# Patient Record
Sex: MALE | Race: WHITE | NOT HISPANIC OR LATINO | Employment: FULL TIME | URBAN - METROPOLITAN AREA
[De-identification: names, ages, dates, MRNs, and addresses within clinical notes are randomized per-mention and may not be internally consistent; named-entity substitution may affect disease eponyms.]

---
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
## 2021-01-06 ENCOUNTER — TELEPHONE (OUTPATIENT)
Dept: CARDIOLOGY | Facility: MEDICAL CENTER | Age: 21
End: 2021-01-06

## 2021-01-06 NOTE — TELEPHONE ENCOUNTER
LVM asking if patient has seen another Cardiologist in the past or had any cardiac testing done outside of [facility] to call with information so that records can be requested prior to appointment.

## 2021-01-11 ENCOUNTER — OFFICE VISIT (OUTPATIENT)
Dept: CARDIOLOGY | Facility: MEDICAL CENTER | Age: 21
End: 2021-01-11
Payer: COMMERCIAL

## 2021-01-11 VITALS
HEART RATE: 77 BPM | DIASTOLIC BLOOD PRESSURE: 58 MMHG | SYSTOLIC BLOOD PRESSURE: 112 MMHG | OXYGEN SATURATION: 98 % | HEIGHT: 76 IN | BODY MASS INDEX: 20.46 KG/M2 | WEIGHT: 168 LBS

## 2021-01-11 DIAGNOSIS — R06.02 SOB (SHORTNESS OF BREATH): ICD-10-CM

## 2021-01-11 DIAGNOSIS — R00.2 PALPITATIONS: ICD-10-CM

## 2021-01-11 PROCEDURE — 99204 OFFICE O/P NEW MOD 45 MIN: CPT | Performed by: INTERNAL MEDICINE

## 2021-01-11 RX ORDER — PSEUDOEPHED/ACETAMINOPH/DIPHEN 30MG-500MG
TABLET ORAL
COMMUNITY
Start: 2020-11-18 | End: 2022-11-07

## 2021-01-11 RX ORDER — NAPROXEN 250 MG/1
TABLET ORAL
COMMUNITY
Start: 2020-11-18 | End: 2022-01-03

## 2021-01-11 RX ORDER — CLINDAMYCIN HYDROCHLORIDE 300 MG/1
CAPSULE ORAL
COMMUNITY
Start: 2020-11-18 | End: 2022-11-07

## 2021-01-11 ASSESSMENT — ENCOUNTER SYMPTOMS
BLURRED VISION: 1
INSOMNIA: 1
GASTROINTESTINAL NEGATIVE: 1
MUSCULOSKELETAL NEGATIVE: 1
PALPITATIONS: 1
SHORTNESS OF BREATH: 1
NEUROLOGICAL NEGATIVE: 1

## 2021-01-11 ASSESSMENT — FIBROSIS 4 INDEX: FIB4 SCORE: 0.42

## 2021-01-12 NOTE — PROGRESS NOTES
"Chief Complaint   Patient presents with   • New Patient   • Palpitations       Subjective:   Jose Enrique Cannon is a 20 y.o. male who presents today for evaluation of palpitation and concerning of the finding on his echocardiography    The patient and in consultation request of Ebony ALVAREZ for above issues.    He is a20 years old male with no known major medical problem.  He is 6 foot 4 and weigh 168 pounds.  His father and his brother both about 6' 2\".  There is no family history of aortic dissection or any significant valvular issue.    He has been experiencing intermittent palpitation for approximately 2 years or so.    First episode occurred after he got stung by a bee.  Has been experiencing shortness of breath with exertion and after multiple testings was told that he may have exercise-induced asthma.  Bronchodilator also appears to be helping.    He described his palpitation as strong thumping and fast beat. They have been somewhat infrequent.  He would experience some discomfort in the low anterior chest with them but he has not experienced syncope so far.     He underwent echocardiography on December 21 mentioned \"elongation of the anterior mitral leaflet\" but the conclusion stated that it was the study was normal.    Because of that that statement along with his symptoms his body habitus, the primary care provider has some concern referred to our clinic for evaluation.    Past Medical History:   Diagnosis Date   • Allergy    • Anxiety      History reviewed. No pertinent surgical history.  Family History   Problem Relation Age of Onset   • Diabetes Mother         prediabetic   • No Known Problems Father      Social History     Socioeconomic History   • Marital status: Single     Spouse name: Not on file   • Number of children: Not on file   • Years of education: Not on file   • Highest education level: Not on file   Occupational History   • Not on file   Social Needs   • Financial resource strain: " Not on file   • Food insecurity     Worry: Not on file     Inability: Not on file   • Transportation needs     Medical: Not on file     Non-medical: Not on file   Tobacco Use   • Smoking status: Never Smoker   • Smokeless tobacco: Never Used   Substance and Sexual Activity   • Alcohol use: Yes     Frequency: Monthly or less     Drinks per session: 3 or 4     Binge frequency: Never   • Drug use: Yes     Types: Marijuana, Inhaled   • Sexual activity: Not Currently   Lifestyle   • Physical activity     Days per week: Not on file     Minutes per session: Not on file   • Stress: Not on file   Relationships   • Social connections     Talks on phone: Not on file     Gets together: Not on file     Attends [employer] service: Not on file     Active member of club or organization: Not on file     Attends meetings of clubs or organizations: Not on file     Relationship status: Not on file   • Intimate partner violence     Fear of current or ex partner: Not on file     Emotionally abused: Not on file     Physically abused: Not on file     Forced sexual activity: Not on file   Other Topics Concern   • Behavioral problems Not Asked   • Interpersonal relationships Not Asked   • Sad or not enjoying activities Not Asked   • Suicidal thoughts Not Asked   • Poor school performance Not Asked   • Reading difficulties Not Asked   • Speech difficulties Not Asked   • Writing difficulties Not Asked   • Inadequate sleep Not Asked   • Excessive TV viewing Not Asked   • Excessive video game use Not Asked   • Inadequate exercise Not Asked   • Sports related Not Asked   • Poor diet Not Asked   • Family concerns for drug/alcohol abuse Not Asked   • Poor oral hygiene Not Asked   • Bike safety Not Asked   • Family concerns vehicle safety Not Asked   Social History Narrative   • Not on file     Allergies   Allergen Reactions   • Penicillins Rash     Outpatient Encounter Medications as of 1/11/2021   Medication Sig Dispense Refill   • ACETAMINOPHEN  "EXTRA STRENGTH 500 MG Tab TAKE 2 TABLETS BY MOUTH EVERY 6 HOURS FOR 3 DAYS AS NEEDED FOR FEVER     • clindamycin (CLEOCIN) 300 MG Cap TAKE 1 CAPSULE BY MOUTH EVERY 6 HOURS FOR 10 DAYS     • naproxen (NAPROSYN) 250 MG Tab TAKE 2 TABLETS BY MOUTH TWICE A DAY FOR 3 DAYS     • montelukast (SINGULAIR) 10 MG Tab Take 1 Tab by mouth every day. (Patient not taking: Reported on 11/16/2020) 90 Tab 0     No facility-administered encounter medications on file as of 1/11/2021.      Review of Systems   HENT: Negative.    Eyes: Positive for blurred vision.   Respiratory: Positive for shortness of breath.    Cardiovascular: Positive for chest pain and palpitations.   Gastrointestinal: Negative.    Genitourinary: Negative.    Musculoskeletal: Negative.    Skin: Negative.    Neurological: Negative.    Endo/Heme/Allergies: Negative.    Psychiatric/Behavioral: The patient has insomnia.    All other systems reviewed and are negative.       Objective:   /58 (BP Location: Left arm, Patient Position: Sitting, BP Cuff Size: Adult)   Pulse 77   Ht 1.93 m (6' 4\")   Wt 76.2 kg (168 lb)   SpO2 98%   BMI 20.45 kg/m²     Physical Exam   Constitutional: He is oriented to person, place, and time. He appears well-developed and well-nourished.   Neck: No JVD present.   Cardiovascular: Normal rate and regular rhythm. Exam reveals no gallop.   No murmur heard.  Flat chest  Straight back   Pulmonary/Chest: Effort normal and breath sounds normal. No respiratory distress. He has no wheezes. He has no rales.   Abdominal: Soft. He exhibits no distension. There is no abdominal tenderness.   Musculoskeletal:         General: No edema.   Neurological: He is alert and oriented to person, place, and time.   Skin: Skin is warm and dry. No erythema.   Psychiatric: He has a normal mood and affect. His behavior is normal.     EKG from January 6 by my review shows sinus rhythm, early transition but oterwise normal    Did review images of his " echocardiography from December 21.  I believe that his mitral valve thickness is normal.  No significant redundancy of the subvalvular structure or mitral leaflet.  The rest of the studies is also normal.    Assessment:     1. Palpitations     2. SOB (shortness of breath)  CANCELED: EKG       Medical Decision Making:  Today's Assessment / Status / Plan:     I had a long discussion with him and his mother.  I believe that his symptoms and the finding of echocardiography predominantly due to his body habitus (sytragight back along with flat chest).  At this point his palpitations are infrequent.  The description is more of either sinus tachycardia or ectopic beat.  We discussed about cardiac monitor if it become more frequent in the future.  I advised him to continue follow-up with his primary care provider for now and to contact us if it become more frequent.  In terms of follow-up on his mitral valve, he consider having repeat echocardiography in 3 to 5 years or sooner if his symptoms worsen prior to that time.

## 2021-11-16 ENCOUNTER — OFFICE VISIT (OUTPATIENT)
Dept: URGENT CARE | Facility: CLINIC | Age: 21
End: 2021-11-16
Payer: COMMERCIAL

## 2021-11-16 VITALS
RESPIRATION RATE: 16 BRPM | DIASTOLIC BLOOD PRESSURE: 68 MMHG | HEART RATE: 96 BPM | BODY MASS INDEX: 21.61 KG/M2 | OXYGEN SATURATION: 90 % | SYSTOLIC BLOOD PRESSURE: 122 MMHG | TEMPERATURE: 98.7 F | WEIGHT: 183 LBS | HEIGHT: 77 IN

## 2021-11-16 DIAGNOSIS — J02.8 ACUTE PHARYNGITIS DUE TO OTHER SPECIFIED ORGANISMS: ICD-10-CM

## 2021-11-16 DIAGNOSIS — J02.9 SORE THROAT: ICD-10-CM

## 2021-11-16 LAB
INT CON NEG: NORMAL
INT CON POS: NORMAL
S PYO AG THROAT QL: NEGATIVE

## 2021-11-16 PROCEDURE — 99213 OFFICE O/P EST LOW 20 MIN: CPT | Performed by: NURSE PRACTITIONER

## 2021-11-16 PROCEDURE — 87880 STREP A ASSAY W/OPTIC: CPT | Performed by: NURSE PRACTITIONER

## 2021-11-16 ASSESSMENT — ENCOUNTER SYMPTOMS
ORTHOPNEA: 0
DIARRHEA: 0
COUGH: 0
SORE THROAT: 1
CHILLS: 1
NAUSEA: 0
EYE DISCHARGE: 0
HEADACHES: 1
MYALGIAS: 0
FEVER: 1

## 2021-11-16 ASSESSMENT — FIBROSIS 4 INDEX: FIB4 SCORE: 0.42

## 2021-11-16 NOTE — LETTER
November 16, 2021        Jose Enrique Cannon  [address] [place] [address]        Jose Enrique was seen in our clinic today and he is excused from work for yesterday and today. Thank you.  If you have any questions or concerns, please don't hesitate to call.        Sincerely,        MICHELLE Chu.P.DAVID.    Electronically Signed

## 2021-11-16 NOTE — PROGRESS NOTES
Subjective     Jose Enrique Cannon is a 20 y.o. male who presents with Sore Throat (sore throat x 2 days)            HPI   New problem.  Patient is a 20-year-old male who presents with a 2-day history of sore throat.  He states that his sore throat started yesterday and is accompanied by bad headache and a low-grade fever of 100.4.  He denies congestion, cough, nausea, diarrhea, body aches, fever, or chills.  He does state he feels improved today.  He has been taking over-the-counter Mucinex for his symptoms.  He is fully vaccinated for COVID-19.    Penicillins  Current Outpatient Medications on File Prior to Visit   Medication Sig Dispense Refill   • ACETAMINOPHEN EXTRA STRENGTH 500 MG Tab TAKE 2 TABLETS BY MOUTH EVERY 6 HOURS FOR 3 DAYS AS NEEDED FOR FEVER     • clindamycin (CLEOCIN) 300 MG Cap TAKE 1 CAPSULE BY MOUTH EVERY 6 HOURS FOR 10 DAYS     • naproxen (NAPROSYN) 250 MG Tab TAKE 2 TABLETS BY MOUTH TWICE A DAY FOR 3 DAYS       No current facility-administered medications on file prior to visit.     Social History     Socioeconomic History   • Marital status: Single     Spouse name: Not on file   • Number of children: Not on file   • Years of education: Not on file   • Highest education level: Not on file   Occupational History   • Not on file   Tobacco Use   • Smoking status: Never Smoker   • Smokeless tobacco: Never Used   Vaping Use   • Vaping Use: Some days   • Substances: THC   • Devices: Disposable   Substance and Sexual Activity   • Alcohol use: Yes   • Drug use: Yes     Types: Marijuana, Inhaled   • Sexual activity: Not Currently   Other Topics Concern   • Behavioral problems Not Asked   • Interpersonal relationships Not Asked   • Sad or not enjoying activities Not Asked   • Suicidal thoughts Not Asked   • Poor school performance Not Asked   • Reading difficulties Not Asked   • Speech difficulties Not Asked   • Writing difficulties Not Asked   • Inadequate sleep Not Asked   • Excessive TV viewing Not  Asked   • Excessive video game use Not Asked   • Inadequate exercise Not Asked   • Sports related Not Asked   • Poor diet Not Asked   • Family concerns for drug/alcohol abuse Not Asked   • Poor oral hygiene Not Asked   • Bike safety Not Asked   • Family concerns vehicle safety Not Asked   Social History Narrative   • Not on file     Social Determinants of Health     Financial Resource Strain:    • Difficulty of Paying Living Expenses: Not on file   Food Insecurity:    • Worried About Running Out of Food in the Last Year: Not on file   • Ran Out of Food in the Last Year: Not on file   Transportation Needs:    • Lack of Transportation (Medical): Not on file   • Lack of Transportation (Non-Medical): Not on file   Physical Activity:    • Days of Exercise per Week: Not on file   • Minutes of Exercise per Session: Not on file   Stress:    • Feeling of Stress : Not on file   Social Connections:    • Frequency of Communication with Friends and Family: Not on file   • Frequency of Social Gatherings with Friends and Family: Not on file   • Attends Sabianism Services: Not on file   • Active Member of Clubs or Organizations: Not on file   • Attends Club or Organization Meetings: Not on file   • Marital Status: Not on file   Intimate Partner Violence:    • Fear of Current or Ex-Partner: Not on file   • Emotionally Abused: Not on file   • Physically Abused: Not on file   • Sexually Abused: Not on file   Housing Stability:    • Unable to Pay for Housing in the Last Year: Not on file   • Number of Places Lived in the Last Year: Not on file   • Unstable Housing in the Last Year: Not on file     Breast Cancer-related family history is not on file.      Review of Systems   Constitutional: Positive for chills, fever and malaise/fatigue.   HENT: Positive for sore throat. Negative for congestion.    Eyes: Negative for discharge.   Respiratory: Negative for cough.    Cardiovascular: Negative for chest pain and orthopnea.  "  Gastrointestinal: Negative for diarrhea and nausea.   Musculoskeletal: Negative for myalgias.   Neurological: Positive for headaches.   Endo/Heme/Allergies: Negative for environmental allergies.              Objective     /68 (BP Location: Left arm, Patient Position: Sitting, BP Cuff Size: Adult)   Pulse 96   Temp 37.1 °C (98.7 °F) (Temporal)   Resp 16   Ht 1.956 m (6' 5\")   Wt 83 kg (183 lb)   SpO2 90%   BMI 21.70 kg/m²      Physical Exam  Vitals and nursing note reviewed.   Constitutional:       General: He is not in acute distress.     Appearance: He is well-developed.   HENT:      Head: Normocephalic and atraumatic.      Right Ear: Ear canal and external ear normal. No middle ear effusion. Tympanic membrane is not injected or perforated.      Left Ear: Ear canal and external ear normal.  No middle ear effusion. Tympanic membrane is not injected or perforated.      Nose: Mucosal edema present.      Mouth/Throat:      Pharynx: Posterior oropharyngeal erythema present. No oropharyngeal exudate.      Comments: Mild erythema  Eyes:      General:         Right eye: No discharge.         Left eye: No discharge.      Conjunctiva/sclera: Conjunctivae normal.   Cardiovascular:      Rate and Rhythm: Normal rate and regular rhythm.      Heart sounds: Normal heart sounds. No murmur heard.      Pulmonary:      Effort: Pulmonary effort is normal. No respiratory distress.      Breath sounds: Normal breath sounds.   Chest:   Breasts:      Right: No supraclavicular adenopathy.      Left: No supraclavicular adenopathy.       Musculoskeletal:         General: Normal range of motion.      Cervical back: Normal range of motion and neck supple.      Comments: Normal movement of all 4 extremities.   Lymphadenopathy:      Cervical: No cervical adenopathy.      Upper Body:      Right upper body: No supraclavicular adenopathy.      Left upper body: No supraclavicular adenopathy.   Skin:     General: Skin is warm and dry. "   Neurological:      Mental Status: He is alert and oriented to person, place, and time.      Gait: Gait normal.   Psychiatric:         Behavior: Behavior normal.         Thought Content: Thought content normal.                             Assessment & Plan        1. Acute pharyngitis due to other specified organisms     2. Sore throat  POCT Rapid Strep A     Rapid strep negative.  Viral illness at this time with no indication for antibiotics. Reviewed with patient expected course of illness and also reviewed OTC medications that may be used for symptom relief. Follow up 7-10 days if not improving.

## 2021-11-17 ENCOUNTER — OFFICE VISIT (OUTPATIENT)
Dept: URGENT CARE | Facility: CLINIC | Age: 21
End: 2021-11-17
Payer: COMMERCIAL

## 2021-11-17 VITALS
HEIGHT: 77 IN | DIASTOLIC BLOOD PRESSURE: 82 MMHG | RESPIRATION RATE: 16 BRPM | BODY MASS INDEX: 21.25 KG/M2 | WEIGHT: 180 LBS | TEMPERATURE: 98.7 F | OXYGEN SATURATION: 96 % | HEART RATE: 104 BPM | SYSTOLIC BLOOD PRESSURE: 120 MMHG

## 2021-11-17 DIAGNOSIS — H65.01 NON-RECURRENT ACUTE SEROUS OTITIS MEDIA OF RIGHT EAR: ICD-10-CM

## 2021-11-17 PROCEDURE — 99213 OFFICE O/P EST LOW 20 MIN: CPT | Performed by: PHYSICIAN ASSISTANT

## 2021-11-17 RX ORDER — METHYLPREDNISOLONE 4 MG/1
TABLET ORAL
Qty: 21 TABLET | Refills: 0 | Status: SHIPPED | OUTPATIENT
Start: 2021-11-17 | End: 2022-11-07

## 2021-11-17 ASSESSMENT — FIBROSIS 4 INDEX: FIB4 SCORE: 0.42

## 2021-11-17 NOTE — PROGRESS NOTES
"Subjective:   Jose Enrique Cannon is a 20 y.o. male who presents for Otalgia (Rt. ear pain.  Patient was seen yesterday)      Otalgia     Patient presents to clinic with complaints of right ear pain onset this morning.  He was recently seen here yesterday with complaints of a sore throat, fatigue, headache and body aches.  He had a negative strep and recommended symptomatic supportive care.  He is vaccinated against COVID.  He had an at home Covid test which was negative.  He denies any right ear drainage.  Denies any left ear pain.  Denies any fever, chills, chest pain, shortness of breath.  He has no other complaints or concerns.  Denies a history of ear infection as an adult.      Medications:    • Acetaminophen Extra Strength Tabs  • clindamycin Caps  • naproxen Tabs    Allergies: Penicillins    Problem List: Jose Enrique Cannon does not have any pertinent problems on file.    Surgical History:  No past surgical history on file.    Past Social Hx: Jose Enrique Cannon  reports that he has never smoked. He has never used smokeless tobacco. He reports current alcohol use. He reports current drug use. Drugs: Marijuana and Inhaled.     Past Family Hx:  Jose Enrique Cannon family history includes Diabetes in his mother; No Known Problems in his father.     Problem list, medications, and allergies reviewed by myself today in Epic.     Objective:     /82 (BP Location: Left arm, Patient Position: Sitting, BP Cuff Size: Large adult)   Pulse (!) 104   Temp 37.1 °C (98.7 °F) (Temporal)   Resp 16   Ht 1.956 m (6' 5\")   Wt 81.6 kg (180 lb)   SpO2 96%   BMI 21.34 kg/m²     Physical Exam  Constitutional:       General: He is not in acute distress.     Appearance: Normal appearance. He is not ill-appearing or toxic-appearing.   HENT:      Right Ear: Ear canal and external ear normal. A middle ear effusion is present. Tympanic membrane is erythematous (mild ). Tympanic membrane is not bulging.      Left Ear: " Tympanic membrane, ear canal and external ear normal.      Mouth/Throat:      Mouth: Mucous membranes are moist.      Pharynx: Oropharynx is clear. Uvula midline. Posterior oropharyngeal erythema present. No pharyngeal swelling, oropharyngeal exudate or uvula swelling.      Tonsils: No tonsillar exudate or tonsillar abscesses.   Eyes:      Conjunctiva/sclera: Conjunctivae normal.      Pupils: Pupils are equal, round, and reactive to light.   Cardiovascular:      Rate and Rhythm: Normal rate and regular rhythm.      Heart sounds: Normal heart sounds.   Pulmonary:      Effort: Pulmonary effort is normal. No respiratory distress.      Breath sounds: Normal breath sounds. No wheezing, rhonchi or rales.   Musculoskeletal:      Cervical back: Neck supple.   Lymphadenopathy:      Cervical: No cervical adenopathy.   Skin:     General: Skin is warm and dry.   Neurological:      General: No focal deficit present.      Mental Status: He is alert and oriented to person, place, and time.   Psychiatric:         Mood and Affect: Mood normal.         Diagnosis and associated orders:     1. Non-recurrent acute serous otitis media of right ear  methylPREDNISolone (MEDROL DOSEPAK) 4 MG Tablet Therapy Pack        Comments/MDM:     • Patient's presenting symptoms and exam findings consistent with acute serous otitis media.  Discussed with patient this is not a bacterial etiology at this time.  Therefore, antibiotics are not indicated and will not help.   • Recommended plenty of fluids, Flonase nasal spray, saline washes, Sudafed sinus or Advil sinus decongestant, Zyrtec or Claritin during the day.  He may try Medrol Dosepak if he would like.  Take this medication in the morning.  Discussed side effects.       I personally reviewed prior external notes and test results pertinent to today's visit. Supportive care, natural history, differential diagnoses, and indications for immediate follow-up discussed. Patient expresses understanding  and agrees to plan. Patient denies any other questions or concerns.     Follow-up with the primary care physician for recheck, reevaluation, and consideration of further management.    Please note that this dictation was created using voice recognition software. I have made a reasonable attempt to correct obvious errors, but I expect that there are errors of grammar and possibly content that I did not discover before finalizing the note.    This note was electronically signed by Jorje Robles PA-C

## 2022-01-03 ENCOUNTER — HOSPITAL ENCOUNTER (EMERGENCY)
Facility: MEDICAL CENTER | Age: 22
End: 2022-01-03
Attending: EMERGENCY MEDICINE
Payer: COMMERCIAL

## 2022-01-03 VITALS
HEIGHT: 77 IN | OXYGEN SATURATION: 95 % | TEMPERATURE: 99.3 F | WEIGHT: 185 LBS | HEART RATE: 92 BPM | DIASTOLIC BLOOD PRESSURE: 71 MMHG | RESPIRATION RATE: 18 BRPM | BODY MASS INDEX: 21.84 KG/M2 | SYSTOLIC BLOOD PRESSURE: 127 MMHG

## 2022-01-03 DIAGNOSIS — K62.89 RECTAL PAIN: ICD-10-CM

## 2022-01-03 DIAGNOSIS — R11.2 NAUSEA AND VOMITING, INTRACTABILITY OF VOMITING NOT SPECIFIED, UNSPECIFIED VOMITING TYPE: ICD-10-CM

## 2022-01-03 DIAGNOSIS — U07.1 COVID-19 VIRUS INFECTION: ICD-10-CM

## 2022-01-03 DIAGNOSIS — R19.7 DIARRHEA, UNSPECIFIED TYPE: ICD-10-CM

## 2022-01-03 PROCEDURE — 700111 HCHG RX REV CODE 636 W/ 250 OVERRIDE (IP): Performed by: EMERGENCY MEDICINE

## 2022-01-03 PROCEDURE — 99283 EMERGENCY DEPT VISIT LOW MDM: CPT

## 2022-01-03 PROCEDURE — 700101 HCHG RX REV CODE 250: Performed by: EMERGENCY MEDICINE

## 2022-01-03 PROCEDURE — 96372 THER/PROPH/DIAG INJ SC/IM: CPT

## 2022-01-03 RX ORDER — IBUPROFEN 600 MG/1
600 TABLET ORAL EVERY 6 HOURS PRN
Qty: 21 TABLET | Refills: 0 | Status: SHIPPED | OUTPATIENT
Start: 2022-01-03 | End: 2022-01-10

## 2022-01-03 RX ORDER — ONDANSETRON 4 MG/1
4 TABLET, ORALLY DISINTEGRATING ORAL EVERY 8 HOURS PRN
Qty: 8 TABLET | Refills: 0 | Status: SHIPPED | OUTPATIENT
Start: 2022-01-03 | End: 2022-01-10

## 2022-01-03 RX ORDER — KETOROLAC TROMETHAMINE 30 MG/ML
15 INJECTION, SOLUTION INTRAMUSCULAR; INTRAVENOUS ONCE
Status: COMPLETED | OUTPATIENT
Start: 2022-01-03 | End: 2022-01-03

## 2022-01-03 RX ORDER — LIDOCAINE HYDROCHLORIDE 20 MG/ML
JELLY TOPICAL ONCE
Status: COMPLETED | OUTPATIENT
Start: 2022-01-03 | End: 2022-01-03

## 2022-01-03 RX ADMIN — LIDOCAINE HYDROCHLORIDE 10 ML: 20 JELLY TOPICAL at 20:15

## 2022-01-03 RX ADMIN — KETOROLAC TROMETHAMINE 15 MG: 30 INJECTION, SOLUTION INTRAMUSCULAR at 21:16

## 2022-01-03 ASSESSMENT — FIBROSIS 4 INDEX: FIB4 SCORE: 0.44

## 2022-01-03 NOTE — Clinical Note
Jose Enrique Cannon was seen and treated in our emergency department on 1/3/2022.  He may return to work on 01/08/2022.  You may return to work after 5 days of being symptom free.  Please see updated CDC guidelines regarding return to work.      If you have any questions or concerns, please don't hesitate to call.      Derik Shore M.D.

## 2022-01-04 NOTE — ED TRIAGE NOTES
Chief Complaint   Patient presents with   • Coronavirus Screening     covid + for 7 days   • Cough   • Fever   • Sore Throat   • Hemorrhoids     bleeding hemorrhoid for 5 days

## 2022-01-04 NOTE — ED PROVIDER NOTES
"Scribed for Derik Shore M.D. by Analilia Kennedy. 1/3/2022  8:09 PM    Primary care provider: VENKATA Moreno  Means of arrival: Walk-in  History obtained from: Patient  History limited by: None    CHIEF COMPLAINT  Chief Complaint   Patient presents with   • Coronavirus Screening     covid + for 7 days   • Cough   • Fever   • Sore Throat   • Hemorrhoids     bleeding hemorrhoid for 5 days     HPI  Jose Enrique Cannon is a 21 y.o. male who presents to the Emergency Department for evaluation of constant worsening rectal pain onset 5 days ago. He states he thinks he has bleeding hemorrhoids and describes the pain as \"the worst he has ever experienced\". He reports he recently returned from [place] and was diagnosed with COVID-19 7 days ago while there. He states if he does not take Immodium, he has bouts of diarrhea once per hour. He notes he has been taking 2-4 immodium per day and having bouts of diarrhea 3-4 times per day. He reports his diarrhea was black when he was still in [place], but it is now clear and watery. He admits to associated symptoms of vomiting, bloating, cough, fever, and sore throat but denies bright red blood in stool, asthma, breathing disorders, or chronic medical conditions. He states he has been using Preparation H medicated wipes with minimal relief. He denies recent antibiotic use. He reports he is allergic to penicillin.    REVIEW OF SYSTEMS  Pertinent positives include: rectal pain, diarrhea, vomiting, bloating cough, fever, and sore throat. Pertinent negatives include: hematochezia, asthma, breathing disorders, or chronic medical conditions. See history of present illness. All other systems are negative.     PAST MEDICAL HISTORY   has a past medical history of Allergy and Anxiety.    SURGICAL HISTORY  patient denies any surgical history    SOCIAL HISTORY  Social History     Tobacco Use   • Smoking status: Never Smoker   • Smokeless tobacco: Never Used   Vaping Use   • Vaping " "Use: Some days   • Substances: THC   • Devices: Disposable   Substance Use Topics   • Alcohol use: Yes   • Drug use: Yes     Types: Marijuana, Inhaled      Social History     Substance and Sexual Activity   Drug Use Yes   • Types: Marijuana, Inhaled     FAMILY HISTORY  Family History   Problem Relation Age of Onset   • Diabetes Mother         prediabetic   • No Known Problems Father      CURRENT MEDICATIONS  Current Outpatient Medications   Medication Instructions   • ACETAMINOPHEN EXTRA STRENGTH 500 MG Tab TAKE 2 TABLETS BY MOUTH EVERY 6 HOURS FOR 3 DAYS AS NEEDED FOR FEVER   • clindamycin (CLEOCIN) 300 MG Cap TAKE 1 CAPSULE BY MOUTH EVERY 6 HOURS FOR 10 DAYS   • methylPREDNISolone (MEDROL DOSEPAK) 4 MG Tablet Therapy Pack Follow schedule on package instructions.   • naproxen (NAPROSYN) 250 MG Tab TAKE 2 TABLETS BY MOUTH TWICE A DAY FOR 3 DAYS     ALLERGIES  Allergies   Allergen Reactions   • Penicillins Rash     PHYSICAL EXAM  VITAL SIGNS: /65   Pulse 97   Temp 36.5 °C (97.7 °F) (Temporal)   Resp 18   Ht 1.956 m (6' 5\")   Wt 83.9 kg (185 lb)   SpO2 99%   BMI 21.94 kg/m²     Constitutional: Alert in no apparent distress.  HENT: No signs of trauma, Bilateral external ears normal, Nose normal. Uvula midline.   Eyes: Pupils are equal and reactive, Conjunctiva normal, Non-icteric.   Neck: Normal range of motion, No tenderness, Supple, No stridor.   Lymphatic: No lymphadenopathy noted.   Cardiovascular: Regular rate and rhythm, no murmurs.   Thorax & Lungs: Normal breath sounds, No respiratory distress, No wheezing, No chest tenderness.   Abdomen:  Soft, No tenderness, No peritoneal signs, No masses, No pulsatile masses.   Skin: Warm, Dry, No erythema, No rash.   Back: No bony tenderness, No CVA tenderness.   Extremities: Intact distal pulses, No edema, No tenderness, No cyanosis.  Musculoskeletal: Good range of motion in all major joints. No tenderness to palpation or major deformities noted.   Neurologic: " Alert , Normal motor function, Normal sensory function, No focal deficits noted.   Psychiatric: Affect normal, Judgment normal, Mood normal.   Rectal: Small fissure in 6 oclock position. No thrombosed hemorrhoids.    COURSE & MEDICAL DECISION MAKING  Nursing notes, VS, PMSFHx reviewed in chart.    21 y.o. male p/w chief complaint of rectal pain and diarrhea.    8:09 PM Patient seen and examined at bedside. Performed rectal exam with nurse chaperoning. Informed the patient it is possible that he may have both COVID-19 and traveller's diarrhea. Recommended drinking Pedialyte for diarrhea and using wet wipes, lidocaine gel, and a bidet for comfort. Discussed the risks of prescription pain medication including constipation and worsening rectal pain and instead recommended he alternate between Tylenol and ibuprofen. Ordered Toradol 15 mg injection and Uro-Jet 2% jelly prior to discharge. I discussed plan for discharge and follow up as outlined below. The patient verbalizes they feel comfortable going home. The patient is stable for discharge at this time and will return for any new or worsening symptoms. Patient verbalizes understanding and support with my plan for discharge.     The differential diagnoses include but are not limited to: rectal pain vs diarrhea vs nausea and vomiting vs COVID-19  No: muffled voice, drooling, stridor, tripoding, trismus, crepitus or trauma.   Patient with small rectal fissure likely as etiology of rectal pain  Patient declined internal exam today due to amount of external pain.  Provided patient with lidocaine jelly and instructions to purchase more over-the-counter lidocaine for external treatment of rectal pain  No evidence of thrombosed hemorrhoids      Unremarkable VS upon dc  No e/o stridor or tongue elevation and pt w/ FROM of neck w/o pain, doubt deep space neck infection  No e/o PTA on exam  No rash or e/o cellulitis       The patient will return for new or worsening symptoms and  is stable at the time of discharge.    The patient is referred to a primary physician for blood pressure management, diabetic screening, and for all other preventative health concerns.    DISPOSITION:  Patient will be discharged home in stable condition.    FOLLOW UP:  MORIS MorenoRPeterNPeter  [address] [place] [id number] [phone]    In 3 days      [facility], Emergency Dept  [address]  [place] [address] [phone]    If symptoms worsen    OUTPATIENT MEDICATIONS:  New Prescriptions    IBUPROFEN (MOTRIN) 600 MG TAB    Take 1 Tablet by mouth every 6 hours as needed for up to 7 days.    ONDANSETRON (ZOFRAN ODT) 4 MG TABLET DISPERSIBLE    Take 1 Tablet by mouth every 8 hours as needed for Nausea for up to 7 days.     FINAL IMPRESSION  1. Diarrhea, unspecified type    2. Nausea and vomiting, intractability of vomiting not specified, unspecified vomiting type    3. COVID-19 virus infection    4. Rectal pain        Analilia GRIMM (Scribaroldo), am scribing for, and in the presence of, Derik Shore M.D..    Electronically signed by: Analilia Kennedy (Scribaroldo), 1/3/2022    Derik GRIMM M.D. personally performed the services described in this documentation, as scribed by Analilia Kennedy in my presence, and it is both accurate and complete.    The note accurately reflects work and decisions made by me.  Derik Shore M.D.  1/4/2022  3:45 AM

## 2022-11-07 ENCOUNTER — OFFICE VISIT (OUTPATIENT)
Dept: MEDICAL GROUP | Facility: MEDICAL CENTER | Age: 22
End: 2022-11-07
Payer: COMMERCIAL

## 2022-11-07 VITALS
DIASTOLIC BLOOD PRESSURE: 50 MMHG | HEIGHT: 77 IN | OXYGEN SATURATION: 97 % | BODY MASS INDEX: 23.38 KG/M2 | WEIGHT: 198 LBS | TEMPERATURE: 97.9 F | SYSTOLIC BLOOD PRESSURE: 102 MMHG | HEART RATE: 100 BPM

## 2022-11-07 DIAGNOSIS — Z13.228 SCREENING FOR ENDOCRINE, METABOLIC AND IMMUNITY DISORDER: ICD-10-CM

## 2022-11-07 DIAGNOSIS — Z13.29 SCREENING FOR ENDOCRINE, METABOLIC AND IMMUNITY DISORDER: ICD-10-CM

## 2022-11-07 DIAGNOSIS — Z23 NEED FOR VACCINATION: ICD-10-CM

## 2022-11-07 DIAGNOSIS — F41.9 ANXIETY: ICD-10-CM

## 2022-11-07 DIAGNOSIS — F33.0 MILD EPISODE OF RECURRENT MAJOR DEPRESSIVE DISORDER (HCC): ICD-10-CM

## 2022-11-07 DIAGNOSIS — Z13.0 SCREENING FOR ENDOCRINE, METABOLIC AND IMMUNITY DISORDER: ICD-10-CM

## 2022-11-07 PROBLEM — F32.9 MAJOR DEPRESSIVE DISORDER: Status: ACTIVE | Noted: 2022-11-07

## 2022-11-07 PROCEDURE — 90651 9VHPV VACCINE 2/3 DOSE IM: CPT

## 2022-11-07 PROCEDURE — 99214 OFFICE O/P EST MOD 30 MIN: CPT | Mod: 25

## 2022-11-07 PROCEDURE — 90472 IMMUNIZATION ADMIN EACH ADD: CPT

## 2022-11-07 PROCEDURE — 90471 IMMUNIZATION ADMIN: CPT

## 2022-11-07 PROCEDURE — 90686 IIV4 VACC NO PRSV 0.5 ML IM: CPT

## 2022-11-07 ASSESSMENT — ENCOUNTER SYMPTOMS
CHILLS: 0
ORTHOPNEA: 0
COUGH: 0
SHORTNESS OF BREATH: 0
FEVER: 0
PALPITATIONS: 0

## 2022-11-07 ASSESSMENT — PATIENT HEALTH QUESTIONNAIRE - PHQ9
5. POOR APPETITE OR OVEREATING: 0 - NOT AT ALL
CLINICAL INTERPRETATION OF PHQ2 SCORE: 3
SUM OF ALL RESPONSES TO PHQ QUESTIONS 1-9: 10

## 2022-11-07 NOTE — PROGRESS NOTES
"Subjective:     CC: Establish Care (Prior pcp : Parth Montanez ) and Anxiety      HPI:   Jose Enrique presents today to establish care and discuss anxiety and depression.    Allergies: Penicillins     Medications:   Current Outpatient Medications:     sertraline (ZOLOFT) 50 MG Tab, Take 1 Tablet by mouth every day., Disp: 30 Tablet, Rfl: 3      ROS:  Review of Systems   Constitutional:  Negative for chills and fever.   Respiratory:  Negative for cough and shortness of breath.    Cardiovascular:  Negative for chest pain, palpitations, orthopnea and leg swelling.     Objective:     Exam:  /50 (BP Location: Left arm, Patient Position: Sitting, BP Cuff Size: Adult)   Pulse 100   Temp 36.6 °C (97.9 °F) (Temporal)   Ht 1.956 m (6' 5\")   Wt 89.8 kg (198 lb)   SpO2 97%   BMI 23.48 kg/m²  Body mass index is 23.48 kg/m².    Physical Exam  Constitutional:       Appearance: He is normal weight.   Eyes:      Pupils: Pupils are equal, round, and reactive to light.   Cardiovascular:      Rate and Rhythm: Normal rate and regular rhythm.      Pulses: Normal pulses.      Heart sounds: Normal heart sounds.   Pulmonary:      Effort: Pulmonary effort is normal.      Breath sounds: Normal breath sounds.   Neurological:      Mental Status: He is alert and oriented to person, place, and time.   Psychiatric:         Mood and Affect: Mood normal.         Behavior: Behavior normal.       Labs/ imaging ordered today: CBC, CMP, TSH    Assessment & Plan:     21 y.o. male with the following -     1. Anxiety  2. Mild episode of recurrent major depressive disorder (HCC)  Chronic, unstable  He reports having anxiety and depression for the last several years.  He states that it has gotten worse in the last year.  He has noticed increased social anxiety as well as anxiety with going to the grocery store.  He tries to avoid going out in public if possible.  He is interested in trying medication.  He specifically requested Zoloft due to a friend " taking it and stating that it helped them.  I think this is a reasonable choice.  Patient instructed to follow-up in 6 weeks.  - Patient has been identified as having a positive depression screening. Appropriate orders and counseling have been given.  - sertraline (ZOLOFT) 50 MG Tab; Take 1 Tablet by mouth every day.  Dispense: 30 Tablet; Refill: 3    3. Need for vaccination  - INFLUENZA VACCINE QUAD INJ (PF)  - Gardasil 9    4. Screening for endocrine, metabolic and immunity disorder  - TSH WITH REFLEX TO FT4; Future  - Comp Metabolic Panel; Future  - CBC WITHOUT DIFFERENTIAL; Future    Anticipatory guidance included the following: Patient counseled about skin care, diet, supplements, smoking, drugs/alcohol use, safe sex and exercise.     Return in about 6 weeks (around 12/19/2022) for F/U depression, anxiety.    Please note that this dictation was created using voice recognition software. I have made every reasonable attempt to correct obvious errors, but I expect that there are errors of grammar and possibly content that I did not discover before finalizing the note.

## 2022-11-16 ENCOUNTER — NON-PROVIDER VISIT (OUTPATIENT)
Dept: MEDICAL GROUP | Facility: MEDICAL CENTER | Age: 22
End: 2022-11-16
Payer: COMMERCIAL

## 2022-11-16 DIAGNOSIS — Z23 NEED FOR VACCINATION: ICD-10-CM

## 2022-11-16 PROCEDURE — 90471 IMMUNIZATION ADMIN: CPT | Performed by: NURSE PRACTITIONER

## 2022-11-16 PROCEDURE — 90715 TDAP VACCINE 7 YRS/> IM: CPT | Performed by: NURSE PRACTITIONER

## 2022-11-16 NOTE — PROGRESS NOTES
"Jose Enrique Cannon is a 21 y.o. male here for a non-provider visit for:   TDAP    Reason for immunization: Overdue/Provider Recommended  Immunization records indicate need for vaccine: Yes, confirmed with Epic  Minimum interval has been met for this vaccine: Yes  ABN completed: Yes    VIS Dated  8/6/2021 was given to patient: Yes  All IAC Questionnaire questions were answered \"No.\"    Patient tolerated injection and no adverse effects were observed or reported: Yes    Pt scheduled for next dose in series: Not Indicated  "

## 2022-12-19 ENCOUNTER — SUPERVISING PHYSICIAN REVIEW (OUTPATIENT)
Dept: MEDICAL GROUP | Facility: MEDICAL CENTER | Age: 22
End: 2022-12-19
Payer: COMMERCIAL

## 2022-12-19 NOTE — PROGRESS NOTES
I have reviewed and agree with history, assessment, and plan for office encounter on 11/7/2022 with MANOLO Martinez.    Face to face encounter/direct observation: no    Suggested changes to plan or follow-up: none

## 2023-03-20 ENCOUNTER — OFFICE VISIT (OUTPATIENT)
Dept: URGENT CARE | Facility: CLINIC | Age: 23
End: 2023-03-20
Payer: COMMERCIAL

## 2023-03-20 VITALS
WEIGHT: 199.8 LBS | HEIGHT: 77 IN | RESPIRATION RATE: 18 BRPM | OXYGEN SATURATION: 97 % | SYSTOLIC BLOOD PRESSURE: 108 MMHG | TEMPERATURE: 98.8 F | DIASTOLIC BLOOD PRESSURE: 70 MMHG | BODY MASS INDEX: 23.59 KG/M2 | HEART RATE: 95 BPM

## 2023-03-20 DIAGNOSIS — J02.0 STREP PHARYNGITIS: ICD-10-CM

## 2023-03-20 DIAGNOSIS — J02.9 SORE THROAT: ICD-10-CM

## 2023-03-20 DIAGNOSIS — R09.82 POSTNASAL DRIP: ICD-10-CM

## 2023-03-20 LAB — S PYO DNA SPEC NAA+PROBE: DETECTED

## 2023-03-20 PROCEDURE — 87651 STREP A DNA AMP PROBE: CPT | Performed by: PHYSICIAN ASSISTANT

## 2023-03-20 PROCEDURE — 99213 OFFICE O/P EST LOW 20 MIN: CPT | Performed by: PHYSICIAN ASSISTANT

## 2023-03-20 RX ORDER — CEPHALEXIN 500 MG/1
500 CAPSULE ORAL 2 TIMES DAILY
Qty: 20 CAPSULE | Refills: 0 | Status: SHIPPED | OUTPATIENT
Start: 2023-03-20 | End: 2023-03-30

## 2023-03-20 ASSESSMENT — ENCOUNTER SYMPTOMS
SORE THROAT: 1
COUGH: 0
HEADACHES: 1

## 2023-03-20 NOTE — PROGRESS NOTES
"  Subjective:   Jose Enrique Cannon is a 22 y.o. male who presents today with   Chief Complaint   Patient presents with    Pharyngitis     X3days fever/headache/body aches/fatigue/       Pharyngitis   This is a new problem. Episode onset: 3 days. The problem has been unchanged. Maximum temperature: subjective. Associated symptoms include headaches. Pertinent negatives include no congestion or coughing. Associated symptoms comments: Body aches, fatigue. He has tried acetaminophen and NSAIDs for the symptoms. The treatment provided mild relief.     PMH:  has a past medical history of Allergy and Anxiety.  MEDS:   Current Outpatient Medications:     cephALEXin (KEFLEX) 500 MG Cap, Take 1 Capsule by mouth 2 times a day for 10 days., Disp: 20 Capsule, Rfl: 0    sertraline (ZOLOFT) 50 MG Tab, Take 1 Tablet by mouth every day., Disp: 30 Tablet, Rfl: 3  ALLERGIES:   Allergies   Allergen Reactions    Penicillins Rash     SURGHX: No past surgical history on file.  SOCHX:  reports that he has never smoked. He has never used smokeless tobacco. He reports current alcohol use of about 2.4 - 3.0 oz per week. He reports that he does not currently use drugs after having used the following drugs: Marijuana and Inhaled.  FH: Reviewed with patient, not pertinent to this visit.       Review of Systems   HENT:  Positive for sore throat. Negative for congestion.    Respiratory:  Negative for cough.    Neurological:  Positive for headaches.      Objective:   /70 (BP Location: Left arm, Patient Position: Sitting)   Pulse 95   Temp 37.1 °C (98.8 °F) (Temporal)   Resp 18   Ht 1.956 m (6' 5\")   Wt 90.6 kg (199 lb 12.8 oz)   SpO2 97%   BMI 23.69 kg/m²   Physical Exam  Vitals and nursing note reviewed.   Constitutional:       General: He is not in acute distress.     Appearance: Normal appearance. He is well-developed. He is not ill-appearing or toxic-appearing.   HENT:      Head: Normocephalic and atraumatic.      Right Ear: " Hearing, tympanic membrane and ear canal normal.      Left Ear: Hearing and tympanic membrane normal.      Mouth/Throat:      Pharynx: Uvula midline. Posterior oropharyngeal erythema present. No uvula swelling.      Tonsils: No tonsillar exudate or tonsillar abscesses. 2+ on the right. 2+ on the left.      Comments: Significant postnasal drainage  Cardiovascular:      Rate and Rhythm: Normal rate and regular rhythm.      Heart sounds: Normal heart sounds.   Pulmonary:      Effort: Pulmonary effort is normal.      Breath sounds: Normal breath sounds. No stridor. No wheezing, rhonchi or rales.   Musculoskeletal:      Comments: Normal movement in all 4 extremities   Skin:     General: Skin is warm and dry.   Neurological:      Mental Status: He is alert.      Coordination: Coordination normal.   Psychiatric:         Mood and Affect: Mood normal.     STREP A +    Assessment/Plan:   Assessment    1. Strep pharyngitis  - cephALEXin (KEFLEX) 500 MG Cap; Take 1 Capsule by mouth 2 times a day for 10 days.  Dispense: 20 Capsule; Refill: 0    2. Sore throat  - POCT GROUP A STREP, PCR    3. Postnasal drip  Symptoms and presentation are consistent with strep and confirmed with rapid testing at this time.  We will treat accordingly with antibiotics.  Recommend patient switch out toothbrush after being on antibiotics for a couple days.    At this time I recommend patient treat postnasal drainage with over-the-counter remedies as we discussed such as Flonase and antihistamine with decongestant component per 's instructions.    Differential diagnosis, natural history, supportive care, and indications for immediate follow-up discussed.   Patient given instructions and understanding of medications and treatment.    If not improving in 3-5 days, F/U with PCP or return to  if symptoms worsen.    Patient agreeable to plan.      Please note that this dictation was created using voice recognition software. I have made every  reasonable attempt to correct obvious errors, but I expect that there are errors of grammar and possibly content that I did not discover before finalizing the note.    Tavo León PA-C

## 2023-03-20 NOTE — LETTER
March 20, 2023         Patient: Jose Enrique Cannon   YOB: 2000   Date of Visit: 3/20/2023           To Whom it May Concern:    Jose Enrique Cannon was seen in my clinic on 3/20/2023.  Please excuse his absence today    If you have any questions or concerns, please don't hesitate to call.        Sincerely,           Tavo León P.A.-C.  Electronically Signed

## 2023-09-13 DIAGNOSIS — F41.9 ANXIETY: ICD-10-CM

## 2023-09-13 DIAGNOSIS — F33.0 MILD EPISODE OF RECURRENT MAJOR DEPRESSIVE DISORDER (HCC): ICD-10-CM

## 2024-10-31 ENCOUNTER — OFFICE VISIT (OUTPATIENT)
Dept: MEDICAL GROUP | Facility: MEDICAL CENTER | Age: 24
End: 2024-10-31
Payer: COMMERCIAL

## 2024-10-31 VITALS
DIASTOLIC BLOOD PRESSURE: 66 MMHG | SYSTOLIC BLOOD PRESSURE: 114 MMHG | BODY MASS INDEX: 23.97 KG/M2 | HEART RATE: 93 BPM | WEIGHT: 203 LBS | TEMPERATURE: 98.3 F | HEIGHT: 77 IN | OXYGEN SATURATION: 98 %

## 2024-10-31 DIAGNOSIS — Z13.0 SCREENING FOR ENDOCRINE, METABOLIC AND IMMUNITY DISORDER: ICD-10-CM

## 2024-10-31 DIAGNOSIS — L65.9 HAIR THINNING: ICD-10-CM

## 2024-10-31 DIAGNOSIS — Z13.6 SCREENING FOR CARDIOVASCULAR CONDITION: ICD-10-CM

## 2024-10-31 DIAGNOSIS — Z13.228 SCREENING FOR ENDOCRINE, METABOLIC AND IMMUNITY DISORDER: ICD-10-CM

## 2024-10-31 DIAGNOSIS — F41.9 ANXIETY: ICD-10-CM

## 2024-10-31 DIAGNOSIS — Z23 NEED FOR VACCINATION: ICD-10-CM

## 2024-10-31 DIAGNOSIS — Z13.29 SCREENING FOR ENDOCRINE, METABOLIC AND IMMUNITY DISORDER: ICD-10-CM

## 2024-10-31 DIAGNOSIS — F33.2 SEVERE EPISODE OF RECURRENT MAJOR DEPRESSIVE DISORDER, WITHOUT PSYCHOTIC FEATURES (HCC): ICD-10-CM

## 2024-10-31 DIAGNOSIS — R41.840 INATTENTION: ICD-10-CM

## 2024-10-31 DIAGNOSIS — Z11.4 ENCOUNTER FOR SCREENING FOR HIV: ICD-10-CM

## 2024-10-31 DIAGNOSIS — Z11.59 NEED FOR HEPATITIS C SCREENING TEST: ICD-10-CM

## 2024-10-31 RX ORDER — FINASTERIDE 5 MG/1
5 TABLET, FILM COATED ORAL DAILY
Qty: 90 TABLET | Refills: 3 | Status: SHIPPED | OUTPATIENT
Start: 2024-10-31

## 2024-10-31 RX ORDER — BUPROPION HYDROCHLORIDE 150 MG/1
150 TABLET ORAL EVERY MORNING
Qty: 90 TABLET | Refills: 3 | Status: SHIPPED | OUTPATIENT
Start: 2024-10-31

## 2024-10-31 ASSESSMENT — ANXIETY QUESTIONNAIRES
3. WORRYING TOO MUCH ABOUT DIFFERENT THINGS: NEARLY EVERY DAY
4. TROUBLE RELAXING: MORE THAN HALF THE DAYS
GAD7 TOTAL SCORE: 15
6. BECOMING EASILY ANNOYED OR IRRITABLE: MORE THAN HALF THE DAYS
5. BEING SO RESTLESS THAT IT IS HARD TO SIT STILL: SEVERAL DAYS
2. NOT BEING ABLE TO STOP OR CONTROL WORRYING: NEARLY EVERY DAY
7. FEELING AFRAID AS IF SOMETHING AWFUL MIGHT HAPPEN: SEVERAL DAYS
1. FEELING NERVOUS, ANXIOUS, OR ON EDGE: NEARLY EVERY DAY
IF YOU CHECKED OFF ANY PROBLEMS ON THIS QUESTIONNAIRE, HOW DIFFICULT HAVE THESE PROBLEMS MADE IT FOR YOU TO DO YOUR WORK, TAKE CARE OF THINGS AT HOME, OR GET ALONG WITH OTHER PEOPLE: VERY DIFFICULT

## 2024-10-31 ASSESSMENT — PATIENT HEALTH QUESTIONNAIRE - PHQ9
4. FEELING TIRED OR HAVING LITTLE ENERGY: NEARLY EVERY DAY
7. TROUBLE CONCENTRATING ON THINGS, SUCH AS READING THE NEWSPAPER OR WATCHING TELEVISION: MORE THAN HALF THE DAYS
9. THOUGHTS THAT YOU WOULD BE BETTER OFF DEAD, OR OF HURTING YOURSELF: NOT AT ALL
1. LITTLE INTEREST OR PLEASURE IN DOING THINGS: MORE THAN HALF THE DAYS
6. FEELING BAD ABOUT YOURSELF - OR THAT YOU ARE A FAILURE OR HAVE LET YOURSELF OR YOUR FAMILY DOWN: MORE THAN HALF THE DAYS
SUM OF ALL RESPONSES TO PHQ QUESTIONS 1-9: 17
3. TROUBLE FALLING OR STAYING ASLEEP OR SLEEPING TOO MUCH: NEARLY EVERY DAY
SUM OF ALL RESPONSES TO PHQ9 QUESTIONS 1 AND 2: 4
8. MOVING OR SPEAKING SO SLOWLY THAT OTHER PEOPLE COULD HAVE NOTICED. OR THE OPPOSITE, BEING SO FIGETY OR RESTLESS THAT YOU HAVE BEEN MOVING AROUND A LOT MORE THAN USUAL: MORE THAN HALF THE DAYS
2. FEELING DOWN, DEPRESSED, IRRITABLE, OR HOPELESS: MORE THAN HALF THE DAYS
5. POOR APPETITE OR OVEREATING: SEVERAL DAYS

## 2024-10-31 ASSESSMENT — ENCOUNTER SYMPTOMS
FEVER: 0
PALPITATIONS: 0
SHORTNESS OF BREATH: 0
CHILLS: 0
ORTHOPNEA: 0
COUGH: 0

## 2024-12-09 DIAGNOSIS — L65.9 HAIR THINNING: ICD-10-CM

## 2024-12-09 DIAGNOSIS — F33.2 SEVERE EPISODE OF RECURRENT MAJOR DEPRESSIVE DISORDER, WITHOUT PSYCHOTIC FEATURES (HCC): ICD-10-CM

## 2024-12-09 DIAGNOSIS — F41.9 ANXIETY: ICD-10-CM

## 2024-12-09 NOTE — TELEPHONE ENCOUNTER
Received request via: Pharmacy    Was the patient seen in the last year in this department? Yes    Does the patient have an active prescription (recently filled or refills available) for medication(s) requested? No    Pharmacy Name: [facility] on [address]     Does the patient have [facility] Plus and need 100-day supply? (This applies to ALL medications) Patient does not have SCP

## 2024-12-09 NOTE — TELEPHONE ENCOUNTER
Received request via: Pharmacy    Was the patient seen in the last year in this department? Yes    Does the patient have an active prescription (recently filled or refills available) for medication(s) requested? No    Pharmacy Name: [facility] [address]     Does the patient have [facility] Plus and need 100-day supply? (This applies to ALL medications) Patient does not have SCP

## 2024-12-10 RX ORDER — FINASTERIDE 5 MG/1
5 TABLET, FILM COATED ORAL DAILY
Qty: 90 TABLET | Refills: 3 | Status: SHIPPED | OUTPATIENT
Start: 2024-12-10

## 2024-12-10 RX ORDER — BUPROPION HYDROCHLORIDE 150 MG/1
150 TABLET ORAL EVERY MORNING
Qty: 90 TABLET | Refills: 3 | Status: SHIPPED | OUTPATIENT
Start: 2024-12-10